# Patient Record
(demographics unavailable — no encounter records)

---

## 2024-11-14 NOTE — PHYSICAL EXAM
[General Appearance - Well Developed] : well developed [General Appearance - In No Acute Distress] : no acute distress [Abdomen Soft] : soft [Abdomen Tenderness] : non-tender [External Female Genitalia] : normal external genitalia [Oriented To Time, Place, And Person] : oriented to person, place, and time [Chaperone Present] : A chaperone was present in the examining room during all aspects of the physical examination [de-identified] : appears to have reasonable support. May have grade 1 rectocele. No signficant cystocele is noted. Paradoxical pelvic floor function. There is possibly minimal urethral prolapse versus caruncle.  [FreeTextEntry2] : NP Micki

## 2024-11-14 NOTE — ASSESSMENT
[FreeTextEntry1] :  66 year old female patient with likely fluid mobilization at night.  We do not see any hydronephrosis.  Bladder seems to be reasonably empty.  We do not see any significant prolapse.  We have encouraged her to elevate her feet during the day to see if that helps. We will see if she has any significant residual. We will set her up for video urodynamics to define her urethral function.  All of the patient's questions were otherwise answered.  Seen with UNIQUE Sweet.  Total time was 45 minutes.   The submitted E/M billing level for this visit reflects the total time spent on the day of the visit including face-to-face time spent with the patient, non-face-to-face review of medical records and relevant information, documentation, and asynchronous communication with the patient after a visit via phone, email, or patients EHR portal after the visit. The medical records reviewed are either scanned into the chart or reviewed with the patient using a patients electronic medical records portal for patients with records not available to [facility] via electronic transmission platforms from other institutions and labs. Time spent counseling and performing coordination of care was also included in determining the appropriate EM billing level.   I have reviewed and verified information regarding the chief complaint and history recorded by the ancillary staff and/or the patient. I have independently reviewed and interpreted tests performed by other physicians and facilities as necessary.   I have discussed with the patient differential diagnosis, reason for auxiliary tests if ordered, risks, benefits, alternatives, and complications of each form of therapy were discussed.  I personally performed the services described in the documentation, reviewed the documentation recorded by the scribe in my presence, and it accurately and completely records my words and actions.

## 2024-11-14 NOTE — ASSESSMENT
[FreeTextEntry1] :  66 year old female patient with likely fluid mobilization at night.  We do not see any hydronephrosis.  Bladder seems to be reasonably empty.  We do not see any significant prolapse.  We have encouraged her to elevate her feet during the day to see if that helps. We will see if she has any significant residual. We will set her up for video urodynamics to define her urethral function.  All of the patient's questions were otherwise answered.  Seen with UNIQUE Sweet.  Total time was 45 minutes.   The submitted E/M billing level for this visit reflects the total time spent on the day of the visit including face-to-face time spent with the patient, non-face-to-face review of medical records and relevant information, documentation, and asynchronous communication with the patient after a visit via phone, email, or patients EHR portal after the visit. The medical records reviewed are either scanned into the chart or reviewed with the patient using a patients electronic medical records portal for patients with records not available to [facility] via electronic transmission platforms from other institutions and labs. Time spent counseling and performing coordination of care was also included in determining the appropriate EM billing level.   I have reviewed and verified information regarding the chief complaint and history recorded by the ancillary staff and/or the patient. I have independently reviewed and interpreted tests performed by other physicians and facilities as necessary.   I have discussed with the patient differential diagnosis, reason for auxiliary tests if ordered, risks, benefits, alternatives, and complications of each form of therapy were discussed.  I personally performed the services described in the documentation, reviewed the documentation recorded by the scribe in my presence, and it accurately and completely records my words and actions.

## 2024-11-14 NOTE — ADDENDUM
[FreeTextEntry1] :  PETAR HERNANDEZ, am scribing for and in the presence of  in the following sections: HISTORY OF PRESENT ILLNESS, PAST MEDICAL/FAMILY/SOCIAL HISTORY, REVIEW OF SYSTEMS, VITAL SIGNS, PHYSICAL EXAM, ASSESSMENT/PLAN on 11/11/2024.

## 2024-11-14 NOTE — HISTORY OF PRESENT ILLNESS
[FreeTextEntry1] :  66 year old female patient referred through emergency room for further work-up of prolapse. She complains that she does not void very frequently during the day. She does not leak or feel she has to urinate much although she drinks a lot of fluid. She voids a moderate amount of fluid at night and leaks at night. She feels her stream is not great. She has a history of prior AP repair and prior hysterectomy. Denies any hematuria, dysuria or UTIs. She also complains of bowel leakage and does not have normal bowel movements.   PMHx: polio and rheumatic fever, mitral valve issues, atrial fibrillation, neck and back problems, CVA,  all vaginal deliveries PSHx: Hysterectomy, mechanical mitral valve replacement for which she is on Coumadin, ablations for atrial fibrillation, thyroid surgery, T10-11, cervical disc fusion, anterior and posterior repair, glaucoma-related surgery, cataract surgery, temporal artery biopsy

## 2024-11-14 NOTE — ASSESSMENT
[FreeTextEntry1] :  66 year old female patient with likely fluid mobilization at night.  We do not see any hydronephrosis.  Bladder seems to be reasonably empty.  We do not see any significant prolapse.  We have encouraged her to elevate her feet during the day to see if that helps. We will see if she has any significant residual. We will set her up for video urodynamics to define her urethral function.  All of the patient's questions were otherwise answered.  Seen with UNIQUE Sweet.  Total time was 45 minutes.   The submitted E/M billing level for this visit reflects the total time spent on the day of the visit including face-to-face time spent with the patient, non-face-to-face review of medical records and relevant information, documentation, and asynchronous communication with the patient after a visit via phone, email, or patients EHR portal after the visit. The medical records reviewed are either scanned into the chart or reviewed with the patient using a patients electronic medical records portal for patients with records not available to [facility] via electronic transmission platforms from other institutions and labs. Time spent counseling and performing coordination of care was also included in determining the appropriate EM billing level.   I have reviewed and verified information regarding the chief complaint and history recorded by the ancillary staff and/or the patient. I have independently reviewed and interpreted tests performed by other physicians and facilities as necessary.   I have discussed with the patient differential diagnosis, reason for auxiliary tests if ordered, risks, benefits, alternatives, and complications of each form of therapy were discussed.  I personally performed the services described in the documentation, reviewed the documentation recorded by the scribe in my presence, and it accurately and completely records my words and actions.

## 2024-11-14 NOTE — PHYSICAL EXAM
[General Appearance - Well Developed] : well developed [General Appearance - In No Acute Distress] : no acute distress [Abdomen Soft] : soft [Abdomen Tenderness] : non-tender [External Female Genitalia] : normal external genitalia [Oriented To Time, Place, And Person] : oriented to person, place, and time [Chaperone Present] : A chaperone was present in the examining room during all aspects of the physical examination [de-identified] : appears to have reasonable support. May have grade 1 rectocele. No signficant cystocele is noted. Paradoxical pelvic floor function. There is possibly minimal urethral prolapse versus caruncle.  [FreeTextEntry2] : NP Micki

## 2024-11-14 NOTE — PHYSICAL EXAM
[General Appearance - Well Developed] : well developed [General Appearance - In No Acute Distress] : no acute distress [Abdomen Soft] : soft [Abdomen Tenderness] : non-tender [External Female Genitalia] : normal external genitalia [Oriented To Time, Place, And Person] : oriented to person, place, and time [Chaperone Present] : A chaperone was present in the examining room during all aspects of the physical examination [de-identified] : appears to have reasonable support. May have grade 1 rectocele. No signficant cystocele is noted. Paradoxical pelvic floor function. There is possibly minimal urethral prolapse versus caruncle.  [FreeTextEntry2] : NP Micki

## 2025-02-03 NOTE — PHYSICAL EXAM
[General Appearance - Well Developed] : well developed [Oriented To Time, Place, And Person] : oriented to person, place, and time [General Appearance - In No Acute Distress] : no acute distress

## 2025-02-06 NOTE — ADDENDUM
[FreeTextEntry1] :  PETAR HERNANDEZ, am scribing for and in the presence of  in the following sections: HISTORY OF PRESENT ILLNESS, PAST MEDICAL/FAMILY/SOCIAL HISTORY, REVIEW OF SYSTEMS, VITAL SIGNS, PHYSICAL EXAM, ASSESSMENT/PLAN on 02/03/2025.

## 2025-02-06 NOTE — ASSESSMENT
[FreeTextEntry1] :  67 year old female patient with frequency and nocturia. She understands this and is not currently bothered by her daytime voiding. I offered her pelvic floor physical therapy to help with relaxation and pelvic floor exercises. She is not interested in anything further at this point and we will see her as needed.  All of the patient's questions were otherwise answered. Pt discussed with UNIQUE Sweet. No new problems. F/u as needed. 20 min discussion   The submitted E/M billing level for this visit reflects the total time spent on the day of the visit including face-to-face time spent with the patient, non-face-to-face review of medical records and relevant information, documentation, and asynchronous communication with the patient after a visit via phone, email, or patients EHR portal after the visit. The medical records reviewed are either scanned into the chart or reviewed with the patient using a patients electronic medical records portal for patients with records not available to [facility] via electronic transmission platforms from other institutions and labs. Time spent counseling and performing coordination of care was also included in determining the appropriate EM billing level.   I have reviewed and verified information regarding the chief complaint and history recorded by the ancillary staff and/or the patient. I have independently reviewed and interpreted tests performed by other physicians and facilities as necessary.   I have discussed with the patient differential diagnosis, reason for auxiliary tests if ordered, risks, benefits, alternatives, and complications of each form of therapy were discussed.  I personally performed the services described in the documentation, reviewed the documentation recorded by the scribe in my presence, and it accurately and completely records my words and actions.

## 2025-02-06 NOTE — HISTORY OF PRESENT ILLNESS
[FreeTextEntry1] :  67 year old female patient seen in follow-up for frequency and nocturia. She underwent video urodynamics which showed bladder of normal capacity and compliance. She is more concerned with her nocturia and we discussed this is likely related to fluid mobilization at night and this is much harder to fix.  Urodynamics from 12/20/24:  1. Bladder of normal capacity and compliance 2. No overactivity 3. Incomplete emptying 4. Grade 2 cystocele 5. Underactive detrusor 6. No reflux or diverticulae

## 2025-02-06 NOTE — ASSESSMENT
[FreeTextEntry1] :  67 year old female patient with frequency and nocturia. She understands this and is not currently bothered by her daytime voiding. I offered her pelvic floor physical therapy to help with relaxation and pelvic floor exercises. She is not interested in anything further at this point and we will see her as needed.  All of the patient's questions were otherwise answered. Pt discussed with UNIQUE Sweet. No new problems. F/u as needed. 20 min discussion   The submitted E/M billing level for this visit reflects the total time spent on the day of the visit including face-to-face time spent with the patient, non-face-to-face review of medical records and relevant information, documentation, and asynchronous communication with the patient after a visit via phone, email, or patients EHR portal after the visit. The medical records reviewed are either scanned into the chart or reviewed with the patient using a patients electronic medical records portal for patients with records not available to [facility] via electronic transmission platforms from other institutions and labs. Time spent counseling and performing coordination of care was also included in determining the appropriate EM billing level.   I have reviewed and verified information regarding the chief complaint and history recorded by the ancillary staff and/or the patient. I have independently reviewed and interpreted tests performed by other physicians and facilities as necessary.   I have discussed with the patient differential diagnosis, reason for auxiliary tests if ordered, risks, benefits, alternatives, and complications of each form of therapy were discussed.  I personally performed the services described in the documentation, reviewed the documentation recorded by the scribe in my presence, and it accurately and completely records my words and actions.